# Patient Record
Sex: FEMALE | Race: BLACK OR AFRICAN AMERICAN | NOT HISPANIC OR LATINO | Employment: UNEMPLOYED | URBAN - METROPOLITAN AREA
[De-identification: names, ages, dates, MRNs, and addresses within clinical notes are randomized per-mention and may not be internally consistent; named-entity substitution may affect disease eponyms.]

---

## 2019-10-13 PROBLEM — G47.33 OSA (OBSTRUCTIVE SLEEP APNEA): Status: ACTIVE | Noted: 2019-10-13

## 2019-10-13 PROBLEM — F32.9 MAJOR DEPRESSIVE DISORDER WITH CURRENT ACTIVE EPISODE: Status: ACTIVE | Noted: 2019-10-13

## 2019-10-13 PROBLEM — Z79.4 TYPE 2 DIABETES MELLITUS WITH HYPERGLYCEMIA, WITH LONG-TERM CURRENT USE OF INSULIN: Status: ACTIVE | Noted: 2019-10-13

## 2019-10-13 PROBLEM — E11.65 TYPE 2 DIABETES MELLITUS WITH HYPERGLYCEMIA, WITH LONG-TERM CURRENT USE OF INSULIN: Status: ACTIVE | Noted: 2019-10-13

## 2019-10-13 PROBLEM — E66.01 CLASS 3 SEVERE OBESITY WITH BODY MASS INDEX (BMI) OF 40.0 TO 44.9 IN ADULT: Status: ACTIVE | Noted: 2019-10-13

## 2019-10-13 PROBLEM — E11.42 DIABETIC POLYNEUROPATHY ASSOCIATED WITH TYPE 2 DIABETES MELLITUS: Status: ACTIVE | Noted: 2019-10-13

## 2019-10-13 PROBLEM — E78.5 HYPERLIPIDEMIA: Status: ACTIVE | Noted: 2019-10-13

## 2019-10-13 PROBLEM — I10 ESSENTIAL HYPERTENSION: Status: ACTIVE | Noted: 2019-10-13

## 2019-11-01 PROBLEM — H25.813 COMBINED FORM OF AGE-RELATED CATARACT, BOTH EYES: Status: ACTIVE | Noted: 2019-11-01

## 2019-11-01 PROBLEM — H01.024 SQUAMOUS BLEPHARITIS OF UPPER EYELIDS OF BOTH EYES: Status: ACTIVE | Noted: 2019-11-01

## 2019-11-01 PROBLEM — E11.3393 MODERATE NONPROLIFERATIVE DIABETIC RETINOPATHY OF BOTH EYES WITHOUT MACULAR EDEMA ASSOCIATED WITH TYPE 2 DIABETES MELLITUS: Status: ACTIVE | Noted: 2019-11-01

## 2019-11-01 PROBLEM — H01.021 SQUAMOUS BLEPHARITIS OF UPPER EYELIDS OF BOTH EYES: Status: ACTIVE | Noted: 2019-11-01

## 2020-03-10 PROBLEM — S31.809A WOUND OF GLUTEAL CLEFT: Status: ACTIVE | Noted: 2020-03-10

## 2020-03-10 PROBLEM — Z00.00 HEALTHCARE MAINTENANCE: Status: ACTIVE | Noted: 2020-03-10

## 2020-03-10 PROBLEM — R10.32 GROIN PAIN, LEFT: Status: ACTIVE | Noted: 2020-03-10

## 2020-05-13 PROBLEM — N95.1 MENOPAUSAL STATE: Status: ACTIVE | Noted: 2020-05-13

## 2020-06-15 PROBLEM — Z00.00 HEALTHCARE MAINTENANCE: Status: RESOLVED | Noted: 2020-03-10 | Resolved: 2020-06-15

## 2020-06-25 PROBLEM — L81.9: Status: ACTIVE | Noted: 2020-06-25

## 2020-07-14 ENCOUNTER — NURSE TRIAGE (OUTPATIENT)
Dept: ADMINISTRATIVE | Facility: CLINIC | Age: 50
End: 2020-07-14

## 2020-07-14 NOTE — TELEPHONE ENCOUNTER
Contacted pt on behalf of post procedural symptom tracker. Chart shows that procedure was cancelled on 7/1 but rescheduled on 7/8. Pt states she did not have procedure on 7/8 either because of insurance issues. No need for further follow up.    Reason for Disposition   General information question, no triage required and triager able to answer question    Protocols used: INFORMATION ONLY CALL - NO TRIAGE-A-

## 2020-09-28 PROBLEM — Z00.00 HEALTHCARE MAINTENANCE: Status: RESOLVED | Noted: 2020-03-10 | Resolved: 2020-09-28

## 2021-02-15 PROBLEM — Z00.00 HEALTHCARE MAINTENANCE: Status: RESOLVED | Noted: 2020-03-10 | Resolved: 2021-02-15

## 2021-02-26 PROBLEM — R42 DIZZINESS: Status: ACTIVE | Noted: 2021-02-26

## 2021-02-26 PROBLEM — S31.809A WOUND OF GLUTEAL CLEFT: Status: RESOLVED | Noted: 2020-03-10 | Resolved: 2021-02-26

## 2021-02-26 PROBLEM — R10.32 GROIN PAIN, LEFT: Status: RESOLVED | Noted: 2020-03-10 | Resolved: 2021-02-26

## 2021-02-26 PROBLEM — K21.9 GASTROESOPHAGEAL REFLUX DISEASE: Status: ACTIVE | Noted: 2021-02-26

## 2021-03-03 PROBLEM — N95.1 HOT FLASHES DUE TO MENOPAUSE: Status: ACTIVE | Noted: 2018-08-22

## 2021-03-03 PROBLEM — E11.610 CHARCOT'S ARTHROPATHY, DIABETIC: Status: ACTIVE | Noted: 2017-08-16

## 2021-08-03 PROBLEM — Z01.419 WELL WOMAN EXAM: Status: ACTIVE | Noted: 2020-03-10

## 2021-09-02 DIAGNOSIS — U07.1 COVID-19 VIRUS DETECTED: ICD-10-CM

## 2021-09-05 ENCOUNTER — PATIENT OUTREACH (OUTPATIENT)
Dept: INFECTIOUS DISEASES | Facility: HOSPITAL | Age: 51
End: 2021-09-05

## 2021-09-05 DIAGNOSIS — U07.1 COVID-19 VIRUS DETECTED: Primary | ICD-10-CM

## 2021-09-15 ENCOUNTER — NURSE TRIAGE (OUTPATIENT)
Dept: ADMINISTRATIVE | Facility: CLINIC | Age: 51
End: 2021-09-15

## 2021-11-22 PROBLEM — H35.052: Status: ACTIVE | Noted: 2021-11-22

## 2021-11-22 PROBLEM — E11.3552 STABLE PROLIFERATIVE DIABETIC RETINOPATHY OF LEFT EYE ASSOCIATED WITH TYPE 2 DIABETES MELLITUS: Status: ACTIVE | Noted: 2021-11-22

## 2021-11-22 PROBLEM — E11.3491 SEVERE NONPROLIFERATIVE DIABETIC RETINOPATHY OF RIGHT EYE WITHOUT MACULAR EDEMA ASSOCIATED WITH TYPE 2 DIABETES MELLITUS: Status: ACTIVE | Noted: 2021-11-22

## 2021-11-22 PROBLEM — H40.013 OAG (OPEN ANGLE GLAUCOMA) SUSPECT, LOW RISK, BILATERAL: Status: ACTIVE | Noted: 2021-11-22

## 2022-07-05 PROBLEM — I63.9 CVA (CEREBRAL VASCULAR ACCIDENT): Status: ACTIVE | Noted: 2022-07-05

## 2022-08-01 PROBLEM — R29.90 STROKE-LIKE EPISODE: Status: ACTIVE | Noted: 2022-08-01

## 2022-08-04 PROBLEM — R42 VERTIGO: Status: RESOLVED | Noted: 2021-02-26 | Resolved: 2022-08-04

## 2022-11-16 PROBLEM — R29.90 STROKE-LIKE SYMPTOMS: Status: ACTIVE | Noted: 2022-11-16
